# Patient Record
Sex: FEMALE | Race: WHITE | NOT HISPANIC OR LATINO | URBAN - METROPOLITAN AREA
[De-identification: names, ages, dates, MRNs, and addresses within clinical notes are randomized per-mention and may not be internally consistent; named-entity substitution may affect disease eponyms.]

---

## 2020-07-25 ENCOUNTER — TELEPHONE ENCOUNTER (OUTPATIENT)
Dept: URBAN - METROPOLITAN AREA CLINIC 13 | Facility: CLINIC | Age: 65
End: 2020-07-25

## 2020-07-26 ENCOUNTER — TELEPHONE ENCOUNTER (OUTPATIENT)
Dept: URBAN - METROPOLITAN AREA CLINIC 13 | Facility: CLINIC | Age: 65
End: 2020-07-26

## 2020-07-26 RX ORDER — ALENDRONATE SODIUM 70 MG
TAKE 1 TABLET ONCE WEEKLY TABLET ORAL
Refills: 0 | Status: ACTIVE | COMMUNITY

## 2020-07-26 RX ORDER — ESTRADIOL 2 MG/1
INSERT 1 RING INTRAVAGINALLY EVERY 3 MONTHS RING VAGINAL
Refills: 0 | Status: ACTIVE | COMMUNITY

## 2020-07-26 RX ORDER — LINACLOTIDE 290 UG/1
TAKE 1 CAPSULE DAILY BEFORE MEALS 30 MINUTES TO ONE HOUR BEFORE BREAKFAST CAPSULE, GELATIN COATED ORAL
Qty: 30 | Refills: 3 | Status: ACTIVE | COMMUNITY
Start: 2014-12-01

## 2020-07-26 RX ORDER — SENNOSIDES 8.6 MG/1
TAKE AS DIRECTED CAPSULE, GELATIN COATED ORAL
Refills: 0 | Status: ACTIVE | COMMUNITY

## 2020-07-26 RX ORDER — MULTIVITAMIN
TAKE 1 CAPSULE DAILY CAPSULE ORAL
Refills: 0 | Status: ACTIVE | COMMUNITY

## 2022-03-08 ENCOUNTER — WEB ENCOUNTER (OUTPATIENT)
Dept: URBAN - METROPOLITAN AREA CLINIC 113 | Facility: CLINIC | Age: 67
End: 2022-03-08

## 2022-03-14 ENCOUNTER — OFFICE VISIT (OUTPATIENT)
Dept: URBAN - METROPOLITAN AREA CLINIC 113 | Facility: CLINIC | Age: 67
End: 2022-03-14
Payer: MEDICARE

## 2022-03-14 VITALS — WEIGHT: 139 LBS | BODY MASS INDEX: 21.82 KG/M2 | HEIGHT: 67 IN

## 2022-03-14 DIAGNOSIS — R19.5 POSITIVE COLORECTAL CANCER SCREENING USING COLOGUARD TEST: ICD-10-CM

## 2022-03-14 DIAGNOSIS — K59.01 SLOW TRANSIT CONSTIPATION: ICD-10-CM

## 2022-03-14 PROCEDURE — 99203 OFFICE O/P NEW LOW 30 MIN: CPT | Performed by: NURSE PRACTITIONER

## 2022-03-14 RX ORDER — ALENDRONATE SODIUM 70 MG
TAKE 1 TABLET ONCE WEEKLY TABLET ORAL
Refills: 0 | Status: ON HOLD | COMMUNITY

## 2022-03-14 RX ORDER — SENNOSIDES 8.6 MG/1
TAKE AS DIRECTED CAPSULE, GELATIN COATED ORAL
Refills: 0 | Status: ON HOLD | COMMUNITY

## 2022-03-14 RX ORDER — LINACLOTIDE 290 UG/1
TAKE 1 CAPSULE DAILY BEFORE MEALS 30 MINUTES TO ONE HOUR BEFORE BREAKFAST CAPSULE, GELATIN COATED ORAL
Qty: 30 | Refills: 3 | Status: ACTIVE | COMMUNITY
Start: 2014-12-01

## 2022-03-14 RX ORDER — ESTRADIOL 2 MG/1
_INSERT 1 RING INTRAVAGINALLY EVERY 3 MONTHS RING VAGINAL
Refills: 0 | Status: ON HOLD | COMMUNITY

## 2022-03-14 RX ORDER — MULTIVITAMIN
TAKE 1 CAPSULE DAILY CAPSULE ORAL
Refills: 0 | Status: ACTIVE | COMMUNITY

## 2022-03-14 RX ORDER — SODIUM, POTASSIUM,MAG SULFATES 17.5-3.13G
354 ML SOLUTION, RECONSTITUTED, ORAL ORAL ONCE
Qty: 354 MILLILITER | Refills: 0 | OUTPATIENT
Start: 2022-03-14 | End: 2022-03-15

## 2022-03-14 RX ORDER — LINACLOTIDE 290 UG/1
TAKE 1 CAPSULE DAILY BEFORE MEALS 30 MINUTES TO ONE HOUR BEFORE BREAKFAST CAPSULE, GELATIN COATED ORAL
OUTPATIENT
Start: 2014-12-01

## 2022-03-14 RX ORDER — ROSUVASTATIN CALCIUM 5 MG/1
1 TABLET TABLET, FILM COATED ORAL ONCE A DAY
Status: ACTIVE | COMMUNITY

## 2022-03-14 NOTE — HPI-OTHER HISTORIES
Positive Cologuard 2/19/22. CT virtual colonoscopy 12/29/11: tortuous sigmoid colon, otherwise normal. Colonoscopy 12/5/11 by Dr. Vila: One 5mm hyperplastic polyp in the sigmoid colon, one 5mm hyperplastic polyp in the rectum, nonbleeding internal hemorrhoids. The preparation of the colon was fair. The procedure was aborted due to significant looping.

## 2022-03-14 NOTE — HPI-TODAY'S VISIT:
67yo female with a history of chronic constipation urgently referred by Dr. José Miguel Chandra for evaluation of a positive Cologuard. She has chronic constipation which has responded to a daily bowel regimen with Linzess 290mcg. She has a daily bowel movement, denying red blood per rectum or melena. No abdominal pain, bloating, nausea, or vomiting. She had an incomplete colonoscopy with Dr. Vila in 2011 followed by a virtual colonoscopy, outlined below. She had a negative Cologuard in 2018.

## 2022-03-30 ENCOUNTER — CLAIMS CREATED FROM THE CLAIM WINDOW (OUTPATIENT)
Dept: URBAN - METROPOLITAN AREA CLINIC 4 | Facility: CLINIC | Age: 67
End: 2022-03-30
Payer: MEDICARE

## 2022-03-30 ENCOUNTER — OFFICE VISIT (OUTPATIENT)
Dept: URBAN - METROPOLITAN AREA SURGERY CENTER 25 | Facility: SURGERY CENTER | Age: 67
End: 2022-03-30
Payer: MEDICARE

## 2022-03-30 DIAGNOSIS — D12.2 ADENOMA OF ASCENDING COLON: ICD-10-CM

## 2022-03-30 DIAGNOSIS — K64.0 INTERNAL HEMORRHOIDS GRADE I: ICD-10-CM

## 2022-03-30 DIAGNOSIS — D12.5 ADENOMA OF SIGMOID COLON: ICD-10-CM

## 2022-03-30 DIAGNOSIS — D12.2 BENIGN NEOPLASM OF ASCENDING COLON: ICD-10-CM

## 2022-03-30 DIAGNOSIS — R19.5 FECES CONTENTS ABNORMAL. + COLOGUARD: ICD-10-CM

## 2022-03-30 DIAGNOSIS — D12.5 BENIGN NEOPLASM OF SIGMOID COLON: ICD-10-CM

## 2022-03-30 PROCEDURE — 88305 TISSUE EXAM BY PATHOLOGIST: CPT | Performed by: PATHOLOGY

## 2022-03-30 PROCEDURE — G8907 PT DOC NO EVENTS ON DISCHARG: HCPCS | Performed by: INTERNAL MEDICINE

## 2022-03-30 PROCEDURE — 45385 COLONOSCOPY W/LESION REMOVAL: CPT | Performed by: INTERNAL MEDICINE

## 2022-03-30 RX ORDER — ALENDRONATE SODIUM 70 MG
TAKE 1 TABLET ONCE WEEKLY TABLET ORAL
Refills: 0 | Status: ON HOLD | COMMUNITY

## 2022-03-30 RX ORDER — SENNOSIDES 8.6 MG/1
TAKE AS DIRECTED CAPSULE, GELATIN COATED ORAL
Refills: 0 | Status: ON HOLD | COMMUNITY

## 2022-03-30 RX ORDER — ESTRADIOL 2 MG/1
_INSERT 1 RING INTRAVAGINALLY EVERY 3 MONTHS RING VAGINAL
Refills: 0 | Status: ON HOLD | COMMUNITY

## 2022-03-30 RX ORDER — ROSUVASTATIN CALCIUM 5 MG/1
1 TABLET TABLET, FILM COATED ORAL ONCE A DAY
Status: ACTIVE | COMMUNITY

## 2022-03-30 RX ORDER — LINACLOTIDE 290 UG/1
TAKE 1 CAPSULE DAILY BEFORE MEALS 30 MINUTES TO ONE HOUR BEFORE BREAKFAST CAPSULE, GELATIN COATED ORAL
Status: ACTIVE | COMMUNITY
Start: 2014-12-01

## 2022-03-30 RX ORDER — MULTIVITAMIN
TAKE 1 CAPSULE DAILY CAPSULE ORAL
Refills: 0 | Status: ACTIVE | COMMUNITY

## 2022-04-13 ENCOUNTER — OFFICE VISIT (OUTPATIENT)
Dept: URBAN - METROPOLITAN AREA CLINIC 113 | Facility: CLINIC | Age: 67
End: 2022-04-13
Payer: MEDICARE

## 2022-04-13 VITALS
WEIGHT: 144 LBS | BODY MASS INDEX: 22.6 KG/M2 | RESPIRATION RATE: 20 BRPM | HEART RATE: 68 BPM | HEIGHT: 67 IN | TEMPERATURE: 97.8 F | DIASTOLIC BLOOD PRESSURE: 69 MMHG | SYSTOLIC BLOOD PRESSURE: 120 MMHG

## 2022-04-13 DIAGNOSIS — K59.01 SLOW TRANSIT CONSTIPATION: ICD-10-CM

## 2022-04-13 DIAGNOSIS — R19.5 POSITIVE COLORECTAL CANCER SCREENING USING COLOGUARD TEST: ICD-10-CM

## 2022-04-13 DIAGNOSIS — Z86.010 HX OF ADENOMATOUS COLONIC POLYPS: ICD-10-CM

## 2022-04-13 PROCEDURE — 99213 OFFICE O/P EST LOW 20 MIN: CPT | Performed by: PHYSICIAN ASSISTANT

## 2022-04-13 RX ORDER — ALENDRONATE SODIUM 70 MG
TAKE 1 TABLET ONCE WEEKLY TABLET ORAL
Refills: 0 | Status: ON HOLD | COMMUNITY

## 2022-04-13 RX ORDER — ESTRADIOL 2 MG/1
_INSERT 1 RING INTRAVAGINALLY EVERY 3 MONTHS RING VAGINAL
Refills: 0 | Status: ON HOLD | COMMUNITY

## 2022-04-13 RX ORDER — LINACLOTIDE 290 UG/1
TAKE 1 CAPSULE DAILY BEFORE MEALS 30 MINUTES TO ONE HOUR BEFORE BREAKFAST CAPSULE, GELATIN COATED ORAL
OUTPATIENT
Start: 2014-12-01

## 2022-04-13 RX ORDER — SENNOSIDES 8.6 MG/1
TAKE AS DIRECTED CAPSULE, GELATIN COATED ORAL
Refills: 0 | Status: ON HOLD | COMMUNITY

## 2022-04-13 RX ORDER — MULTIVITAMIN
TAKE 1 CAPSULE DAILY CAPSULE ORAL
Refills: 0 | Status: ACTIVE | COMMUNITY

## 2022-04-13 RX ORDER — ROSUVASTATIN CALCIUM 5 MG/1
1 TABLET TABLET, FILM COATED ORAL ONCE A DAY
Status: ACTIVE | COMMUNITY

## 2022-04-13 RX ORDER — LINACLOTIDE 290 UG/1
TAKE 1 CAPSULE DAILY BEFORE MEALS 30 MINUTES TO ONE HOUR BEFORE BREAKFAST CAPSULE, GELATIN COATED ORAL
Status: ACTIVE | COMMUNITY
Start: 2014-12-01

## 2022-04-13 NOTE — HPI-TODAY'S VISIT:
Ms. Whitley is a 66-year-old woman with a history of chronic constipation, presenting for follow-up after undergoing colonoscopy performed for evaluation of positive Cologuard screening test. She was last seen on 3/14/2022 to discuss colonoscopy for positive Cologuard.  She was noted to be doing well at that time.  Constipation was controlled on Linzess 290 mcg once daily. Colonoscopy (3/30/2022): [place] bowel preparation scale score of 9. Two 5 to 6 mm polyps in the sigmoid colon and in the ascending colon.  Diverticulosis in sigmoid colon.  Nonbleeding internal hemorrhoids, grade 1.  Pathology revealed these to be tubular adenomas.  A repeat colonoscopy is recommended in 5 years as part of colon cancer prevention.  She states that she feels well today.  She states that her chronic constipation is doing fairly well on regimen consisting of Linzess 290 mcg once daily.  She denies any red blood per rectum, melena or hematochezia.  No abdominal pain, nausea, abdominal bloating or vomiting.  She denies any issues with heartburn or acid reflux.  No difficulty swallowing.  Her weight remains stable.

## 2022-04-23 PROBLEM — 271840007 ABNORMAL FECES: Status: ACTIVE | Noted: 2022-04-23

## 2022-04-23 PROBLEM — 429047008: Status: ACTIVE | Noted: 2022-04-23

## 2022-04-23 PROBLEM — 35298007 SLOW TRANSIT CONSTIPATION: Status: ACTIVE | Noted: 2022-03-14

## 2024-04-09 ENCOUNTER — OV CON (OUTPATIENT)
Dept: URBAN - METROPOLITAN AREA CLINIC 113 | Facility: CLINIC | Age: 69
End: 2024-04-09
Payer: MEDICARE

## 2024-04-09 ENCOUNTER — LAB (OUTPATIENT)
Dept: URBAN - METROPOLITAN AREA CLINIC 113 | Facility: CLINIC | Age: 69
End: 2024-04-09

## 2024-04-09 VITALS
TEMPERATURE: 97.7 F | SYSTOLIC BLOOD PRESSURE: 136 MMHG | WEIGHT: 136.6 LBS | HEIGHT: 67 IN | DIASTOLIC BLOOD PRESSURE: 69 MMHG | BODY MASS INDEX: 21.44 KG/M2 | RESPIRATION RATE: 20 BRPM | HEART RATE: 60 BPM

## 2024-04-09 DIAGNOSIS — K76.89 HEPATIC CYST: ICD-10-CM

## 2024-04-09 DIAGNOSIS — K59.09 CHANGE IN BOWEL MOVEMENTS INTERMITTENT CONSTIPATION. URGENCY IN THE MORNING.: ICD-10-CM

## 2024-04-09 PROBLEM — 85057007: Status: ACTIVE | Noted: 2024-04-09

## 2024-04-09 PROCEDURE — 99244 OFF/OP CNSLTJ NEW/EST MOD 40: CPT

## 2024-04-09 PROCEDURE — 99214 OFFICE O/P EST MOD 30 MIN: CPT

## 2024-04-09 RX ORDER — LINACLOTIDE 290 UG/1
TAKE 1 CAPSULE DAILY BEFORE MEALS 30 MINUTES TO ONE HOUR BEFORE BREAKFAST CAPSULE, GELATIN COATED ORAL
OUTPATIENT

## 2024-04-09 RX ORDER — LINACLOTIDE 290 UG/1
TAKE 1 CAPSULE DAILY BEFORE MEALS 30 MINUTES TO ONE HOUR BEFORE BREAKFAST CAPSULE, GELATIN COATED ORAL
Status: ACTIVE | COMMUNITY
Start: 2014-12-01

## 2024-04-09 RX ORDER — ROSUVASTATIN CALCIUM 5 MG/1
1 TABLET TABLET, FILM COATED ORAL ONCE A DAY
Status: ACTIVE | COMMUNITY

## 2024-04-09 RX ORDER — MULTIVITAMIN
TAKE 1 CAPSULE DAILY CAPSULE ORAL
Refills: 0 | Status: ACTIVE | COMMUNITY

## 2024-04-09 RX ORDER — DENOSUMAB 60 MG/ML
AS DIRECTED INJECTION SUBCUTANEOUS
Status: ACTIVE | COMMUNITY

## 2024-04-09 NOTE — HPI-TODAY'S VISIT:
Ms. Whitley is a 68-year-old woman with history of chronic constipation, adenomatous colon polyps, hyperlipidemia who was referred to our office by Dr. Zach Chandra for evaluation of hepatic cyst.  A copy of today's visit we forwarded to referring provider.  She was last seen in our office on 4/13/2022 for follow-up after recent colonoscopy that was scheduled after positive Cologuard screening test.  Her colonoscopy was notable for removal of 2 small tubular adenomas.  Findings also included sigmoid colon diverticulosis and nonbleeding internal hemorrhoids.  Repeat colonoscopy is recommended in 5 years as part of colon cancer prevention.  The features of diverticulosis and diverticulitis as well as the benefits of daily fiber were discussed with patient.  Her constipation was well-managed with Linzess at that time.  Today she reports that she is doing well overall.  She reports that Dr. Chandra has been following these liver cysts for a few years and that they have not changed over this time.  Her bowels are moving regularly with Linzess 290 mcg.  There is no blood per rectum or melena.  She denies abdominal pain, nausea, vomiting, or unintentional weight loss.  She had an abdominal ultrasound on 10/25/2022 that showed right hepatic lobe 8 mm cyst and a right interpolar kidney 6 mm cyst.  Her most recent abdominal ultrasound completed on 2/2/2024 showed normal liver.  Simple hepatic cyst measuring 8 mm and 7 mm.  Normal gallbladder, bile duct 0.3 cm her most recent labs showed elevated hematocrit 44.2, elevated .7, elevated MCH 33.4, elevated MCHC 31.9, overall normal CMP.  She also had testing for hemochromatosis which was negative for C282Y, negative H63D, negative S6 5C

## 2024-04-09 NOTE — HPI-OTHER HISTORIES
Colonoscopy (3/30/2022) was notable for removal of 2 small tubular adenomas. Findings also included sigmoid colon diverticulosis and nonbleeding internal hemorrhoids. Repeat colonoscopy is recommended in 5 years  Positive Cologuard 2/19/22. CT virtual colonoscopy 12/29/11: tortuous sigmoid colon, otherwise normal. Colonoscopy 12/5/11 by Dr. Vila: One 5mm hyperplastic polyp in the sigmoid colon, one 5mm hyperplastic polyp in the rectum, nonbleeding internal hemorrhoids. The preparation of the colon was fair. The procedure was aborted due to significant looping.

## 2024-06-03 ENCOUNTER — TELEPHONE ENCOUNTER (OUTPATIENT)
Dept: URBAN - METROPOLITAN AREA CLINIC 113 | Facility: CLINIC | Age: 69
End: 2024-06-03

## 2024-06-03 RX ORDER — LINACLOTIDE 290 UG/1
TAKE 1 CAPSULE DAILY BEFORE MEALS 30 MINUTES TO ONE HOUR BEFORE BREAKFAST CAPSULE, GELATIN COATED ORAL ONCE A DAY
Qty: 90 | Refills: 1

## 2024-12-02 ENCOUNTER — WEB ENCOUNTER (OUTPATIENT)
Dept: URBAN - METROPOLITAN AREA CLINIC 113 | Facility: CLINIC | Age: 69
End: 2024-12-02

## 2024-12-02 RX ORDER — LINACLOTIDE 290 UG/1
TAKE 1 CAPSULE DAILY BEFORE MEALS 30 MINUTES TO ONE HOUR BEFORE BREAKFAST CAPSULE, GELATIN COATED ORAL ONCE A DAY
Qty: 90 | Refills: 3

## 2024-12-03 ENCOUNTER — ERX REFILL RESPONSE (OUTPATIENT)
Dept: URBAN - METROPOLITAN AREA CLINIC 113 | Facility: CLINIC | Age: 69
End: 2024-12-03

## 2024-12-03 RX ORDER — LINACLOTIDE 290 UG/1
TAKE 1 CAPSULE DAILY BEFORE MEALS 30 MINUTES TO ONE HOUR BEFORE BREAKFAST CAPSULE, GELATIN COATED ORAL ONCE A DAY
Qty: 90 | Refills: 3 | OUTPATIENT

## 2024-12-03 RX ORDER — LINACLOTIDE 290 UG/1
TAKE 1 CAPSULE BY MOUTH DAILY 30 MINUTES TO 1 HOUR BEFORE BREAKFAST CAPSULE, GELATIN COATED ORAL
Qty: 90 CAPSULE | Refills: 2 | OUTPATIENT

## 2025-04-09 ENCOUNTER — OFFICE VISIT (OUTPATIENT)
Dept: URBAN - METROPOLITAN AREA CLINIC 113 | Facility: CLINIC | Age: 70
End: 2025-04-09
Payer: MEDICARE

## 2025-04-09 ENCOUNTER — DASHBOARD ENCOUNTERS (OUTPATIENT)
Age: 70
End: 2025-04-09

## 2025-04-09 ENCOUNTER — LAB OUTSIDE AN ENCOUNTER (OUTPATIENT)
Dept: URBAN - METROPOLITAN AREA CLINIC 113 | Facility: CLINIC | Age: 70
End: 2025-04-09

## 2025-04-09 DIAGNOSIS — K59.01 SLOW TRANSIT CONSTIPATION: ICD-10-CM

## 2025-04-09 DIAGNOSIS — K76.89 HEPATIC CYST: ICD-10-CM

## 2025-04-09 DIAGNOSIS — Z86.0101 HISTORY OF ADENOMATOUS POLYP OF COLON: ICD-10-CM

## 2025-04-09 PROCEDURE — 99213 OFFICE O/P EST LOW 20 MIN: CPT

## 2025-04-09 RX ORDER — ROSUVASTATIN 5 MG/1
1 TABLET TABLET, FILM COATED ORAL ONCE A DAY
Status: ACTIVE | COMMUNITY

## 2025-04-09 RX ORDER — DENOSUMAB 60 MG/ML
AS DIRECTED INJECTION SUBCUTANEOUS
Status: ACTIVE | COMMUNITY

## 2025-04-09 RX ORDER — LINACLOTIDE 290 UG/1
TAKE 1 CAPSULE BY MOUTH DAILY 30 MINUTES TO 1 HOUR BEFORE BREAKFAST CAPSULE, GELATIN COATED ORAL
Qty: 90 CAPSULE | Refills: 2 | Status: ACTIVE | COMMUNITY

## 2025-04-09 RX ORDER — MELOXICAM 15 MG/1
1 TABLET TABLET ORAL ONCE A DAY
Status: ACTIVE | COMMUNITY

## 2025-04-09 RX ORDER — MULTIVITAMIN
TAKE 1 CAPSULE DAILY CAPSULE ORAL
Refills: 0 | Status: ACTIVE | COMMUNITY

## 2025-04-09 RX ORDER — LINACLOTIDE 290 UG/1
TAKE 1 CAPSULE DAILY BEFORE MEALS 30 MINUTES TO ONE HOUR BEFORE BREAKFAST CAPSULE, GELATIN COATED ORAL DAILY
Qty: 90 | Refills: 3
Start: 2025-04-09 | End: 2026-04-04

## 2025-04-09 NOTE — HPI-TODAY'S VISIT:
Ms. Whitley is a 69-year-old woman with a history of chronic constipation, adenomatous colon polyps, hyperlipidemia presenting for annual follow-up.  She was last seen in office on 4/9/2024 for evaluation and management of hepatic cysts for which Dr. Chandra has been following over the last 2 years.  Most recent abdominal ultrasound showed subcentimeter hepatic cysts which are overall unchanged from previous abdominal ultrasound.  No further workup is required, however as her previous PCP was following this we will repeat ultrasound in 1 year to assure stability of the cyst.  History of adenomatous colon polyps due surveillance in 2027.  Slow transit constipation well-controlled Linzess 290 mcg.  Today she reports she is doing very well overall.She stays busy caring for her twin granddaughters in the afternoons. Linzess remains effective for her constipation. She is without new GI Symptoms her concerns. Denies any changes to her medical or surgical history.